# Patient Record
Sex: MALE | Race: WHITE | NOT HISPANIC OR LATINO | Employment: STUDENT | URBAN - METROPOLITAN AREA
[De-identification: names, ages, dates, MRNs, and addresses within clinical notes are randomized per-mention and may not be internally consistent; named-entity substitution may affect disease eponyms.]

---
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
## 2019-09-16 ENCOUNTER — OFFICE VISIT (OUTPATIENT)
Dept: FAMILY MEDICINE CLINIC | Facility: CLINIC | Age: 19
End: 2019-09-16

## 2019-09-16 VITALS
DIASTOLIC BLOOD PRESSURE: 70 MMHG | HEART RATE: 66 BPM | SYSTOLIC BLOOD PRESSURE: 110 MMHG | WEIGHT: 178 LBS | HEIGHT: 71 IN | BODY MASS INDEX: 24.92 KG/M2

## 2019-09-16 DIAGNOSIS — Z02.89 ENCOUNTER FOR FEDERAL AVIATION ADMINISTRATION (FAA) EXAMINATION: Primary | ICD-10-CM

## 2019-09-16 PROCEDURE — 99499 UNLISTED E&M SERVICE: CPT | Performed by: FAMILY MEDICINE

## 2019-09-16 NOTE — PROGRESS NOTES
Chief Complaint   Patient presents with    Physical Exam     FAA 1st class (NO EKG) [facility] #[id number]  Uncorrected vision  No meds

## 2021-01-28 ENCOUNTER — OFFICE VISIT (OUTPATIENT)
Dept: FAMILY MEDICINE CLINIC | Facility: CLINIC | Age: 21
End: 2021-01-28
Payer: COMMERCIAL

## 2021-01-28 VITALS
BODY MASS INDEX: 25.92 KG/M2 | HEIGHT: 71 IN | DIASTOLIC BLOOD PRESSURE: 72 MMHG | WEIGHT: 185.14 LBS | OXYGEN SATURATION: 99 % | SYSTOLIC BLOOD PRESSURE: 116 MMHG | HEART RATE: 75 BPM | TEMPERATURE: 97.8 F

## 2021-01-28 DIAGNOSIS — Z11.3 SCREEN FOR STD (SEXUALLY TRANSMITTED DISEASE): ICD-10-CM

## 2021-01-28 DIAGNOSIS — Z23 NEEDS FLU SHOT: ICD-10-CM

## 2021-01-28 DIAGNOSIS — M25.511 CHRONIC RIGHT SHOULDER PAIN: ICD-10-CM

## 2021-01-28 DIAGNOSIS — Z23 IMMUNIZATION DUE: ICD-10-CM

## 2021-01-28 DIAGNOSIS — G89.29 CHRONIC RIGHT SHOULDER PAIN: ICD-10-CM

## 2021-01-28 DIAGNOSIS — Z00.00 ANNUAL PHYSICAL EXAM: Primary | ICD-10-CM

## 2021-01-28 PROBLEM — Z02.89 ENCOUNTER FOR FEDERAL AVIATION ADMINISTRATION (FAA) EXAMINATION: Status: RESOLVED | Noted: 2019-09-16 | Resolved: 2021-01-28

## 2021-01-28 PROCEDURE — 90715 TDAP VACCINE 7 YRS/> IM: CPT | Performed by: FAMILY MEDICINE

## 2021-01-28 PROCEDURE — 1036F TOBACCO NON-USER: CPT | Performed by: FAMILY MEDICINE

## 2021-01-28 PROCEDURE — 87591 N.GONORRHOEAE DNA AMP PROB: CPT | Performed by: FAMILY MEDICINE

## 2021-01-28 PROCEDURE — 87109 MYCOPLASMA: CPT | Performed by: FAMILY MEDICINE

## 2021-01-28 PROCEDURE — 3725F SCREEN DEPRESSION PERFORMED: CPT | Performed by: FAMILY MEDICINE

## 2021-01-28 PROCEDURE — 3008F BODY MASS INDEX DOCD: CPT | Performed by: FAMILY MEDICINE

## 2021-01-28 PROCEDURE — 90472 IMMUNIZATION ADMIN EACH ADD: CPT | Performed by: FAMILY MEDICINE

## 2021-01-28 PROCEDURE — 90686 IIV4 VACC NO PRSV 0.5 ML IM: CPT | Performed by: FAMILY MEDICINE

## 2021-01-28 PROCEDURE — 87491 CHLMYD TRACH DNA AMP PROBE: CPT | Performed by: FAMILY MEDICINE

## 2021-01-28 PROCEDURE — 99385 PREV VISIT NEW AGE 18-39: CPT | Performed by: FAMILY MEDICINE

## 2021-01-28 PROCEDURE — 90471 IMMUNIZATION ADMIN: CPT | Performed by: FAMILY MEDICINE

## 2021-01-28 NOTE — PATIENT INSTRUCTIONS
Biceps Tenodesis   AMBULATORY CARE:   What you need to know about a biceps tenodesis:  Biceps tenodesis is surgery to repair a biceps tendon tear or to help make the tendon more stable  The biceps tendon will be detached from your shoulder socket  The damaged part of the tendon will be removed  Then the tendon will be attached to your upper arm bone (called the humerus)  This helps relieve pain in your shoulder and make it more stable  How to prepare for surgery:   · This surgery may be done as an open surgery or arthroscopically  During an open procedure one large and several small incisions are made  During an arthroscopic procedure, several small incisions are made  An arthroscope is used to look inside your joint  Your surgeon will insert tools and the arthroscope into the incisions to work on the tendon  Your surgeon will talk to you about which kind of surgery is right for you  · You may be given medicine to use to clean your skin  You will need to use it at home a few days before surgery and on the day of surgery  The medicine will come as a soap to shower with or cloths to wipe the area  Your healthcare provider will give you specific instructions if you need to use this medicine  · Your surgeon will tell you how to prepare for surgery  You will be told not to eat or drink anything after midnight on the day of surgery  Your surgeon will tell you which medicines to take or not take on the day of surgery  Arrange for someone to drive you home after surgery and stay with you for at least 24 hours  What will happen during surgery:   · You will be given medicine to keep you asleep and pain-free during surgery  · The 2 main kinds of biceps tenodesis surgery are soft tissue and hardware fixation:    ? For a soft tissue surgery,  the tendon is moved into a groove made in the humerus  Your surgeon will detach the tendon, roll the end into a ball, and stitch it together   Then it is put into the groove made in the humerus and moved so it will stay in place  ? For a hardware fixation surgery,  the tendon is moved into a hole your surgeon will make  A screw or other device will be used to fix the tendon to the bone  What will happen after surgery: Your arm will be put into a sling for up to 6 weeks to keep it from moving while you heal  Your healthcare providers will tell you if you need to sleep in the sling and when to wear it during the day  You may have a stiff or painful shoulder after surgery  This is normal and should get better with time and physical therapy  Your providers will tell you when to start having shoulder rehabilitation  These are exercises that will help increase your range of motion  Risks of a biceps tenodesis:  You may develop an infection or bleed more than expected during surgery  You may also develop a hematoma (collection of blood) or a seroma (collection of fluid) under your skin  Nerves or veins may be injured or the humerus may be broken  You may have an allergic reaction to the fixation device  Any device used to fix the tendon to bone may fail  You may have chronic shoulder pain or stiffness, even after physical therapy  Seek care immediately if:   · Blood soaks through your bandage  · You cannot feel or move your fingers or arm  Contact your healthcare provider if:   · You have signs of infection in the surgery wound, such as swelling, pus, or red streaks  · You have a fever or chills  · You have severe pain that is not helped with pain medicine  · You have questions or concerns about your condition or care  Medicines:   · Prescription pain medicine  may be given  Ask your healthcare provider how to take this medicine safely  Some prescription pain medicines contain acetaminophen  Do not take other medicines that contain acetaminophen without talking to your healthcare provider  Too much acetaminophen may cause liver damage   Prescription pain medicine may cause constipation  Ask your healthcare provider how to prevent or treat constipation  · Take your medicine as directed  Contact your healthcare provider if you think your medicine is not helping or if you have side effects  Tell him or her if you are allergic to any medicine  Keep a list of the medicines, vitamins, and herbs you take  Include the amounts, and when and why you take them  Bring the list or the pill bottles to follow-up visits  Carry your medicine list with you in case of an emergency  Apply ice to your shoulder:  Ice helps decrease swelling and pain and may help prevent tissue damage  Use an ice pack or put crushed ice in a plastic bag  Cover the bag with a towel before you apply it to your skin  Apply the pack or bag to your shoulder for 15 to 20 minutes every hour, or as directed  Your healthcare provider will tell you how often to do this and for how many days after surgery  Care for your incision wound as directed:   · Keep your wound dry  When your healthcare provider says it is okay to bathe, cover the incision wound with a plastic bag  Tape the ends of the bag to your skin so water does not get in  Take showers instead of baths until your healthcare provider says it is okay to take a bath  Do not swim or soak in a hot tub  · Clean your wound as directed  Your healthcare provider will tell you when it is okay to remove the bandage and care for your wound  He or she will tell you how often to clean the wound and what to use to clean it  Check for signs of infection each time you care for the wound  Signs include pus, swelling, a fever, chills, and red streaks coming from the wound  Activity:   · Your healthcare provider and physical therapist will give you specific instructions to follow  It is very important to follow the instructions carefully so you do not injure your shoulder while it is healing      · Your healthcare provider will tell you when you can start driving and doing your normal activities again  Tell your provider what kind of work you do  You may need specific instructions if you do work that requires lifting objects or moving your arm overhead  · Do not  lift anything until your healthcare provider says it is okay  · Do not  play any sports that need throwing or overhead motion (such as baseball or tennis) until at least 16 weeks after surgery  Talk to your healthcare provider or physical therapist to make sure you are ready for this activity  Go to physical therapy as directed:  Physical therapy will happen in 4 phases:  · The protective phase  is the first 6 weeks after surgery  You will need to protect your shoulder by wearing a sling  Your healthcare providers will tell you how long to wear the sling each day and if you need to sleep in it  During this time, you will mainly do passive range-of-motion exercises  This means your physical therapist will gently move your shoulder in different directions to keep it flexible  · The moderate protection phase  is from week 7 to week 12  You will start wearing the sling less often  Physical therapy will focus on strengthening your rotator cuff  You will use bands or weights to help with this  You will begin to strengthen your biceps at about week 10  Your physical therapist will help you do this safely  You may also need to do stretching exercises at week 10 to improve your range of motion  · The minimum protection phase  is from week 13 to week 20  You will be doing active and passive range-of-motion exercises at this stage  You will also do exercises to increase strength  · The advanced strengthening phase  is from week 21 to week 26  At this stage, you will be doing advanced exercises to increase strength  Follow up with your healthcare provider as directed:  Write down your questions so you remember to ask them during your visits     © Copyright [employer] 2020 Information is for End User's use only and may not be sold, redistributed or otherwise used for commercial purposes  All illustrations and images included in CareNotes® are the copyrighted property of A D A M , Inc  or Alissa Delaney  The above information is an  only  It is not intended as medical advice for individual conditions or treatments  Talk to your doctor, nurse or pharmacist before following any medical regimen to see if it is safe and effective for you  Shoulder Impingement Syndrome   WHAT YOU NEED TO KNOW:   The most common type of shoulder impingement syndrome is subacromial impingement syndrome (SIS)  SIS happens when the tendons or bursa become trapped between bones of your shoulder joint  You have pain when you reach over your head or lie on the shoulder while sleeping  You may also have pain when you move your arm out to the side or behind your body  DISCHARGE INSTRUCTIONS:   Return to the emergency department if:   · Your shoulder, arm, hand, or fingers turn bluish or pale, or feel cold or numb  · Your pain gets worse, even after rest and medicine  · You cannot move your fingers  Call your doctor if:   · You have questions or concerns about your condition or care  Medicines: You may  be given any of the following:  · Acetaminophen  decreases pain  It is available without a doctor's order  Ask how much to take and how often to take it  Follow directions  Read the labels of all other medicines you are using to see if they also contain acetaminophen, or ask your doctor or pharmacist  Acetaminophen can cause liver damage if not taken correctly  Do not use more than 4 grams (4,000 milligrams) total of acetaminophen in one day  · NSAIDs,  such as ibuprofen, help decrease swelling and pain  This medicine is available with or without a doctor's order  NSAIDs can cause stomach bleeding or kidney problems in certain people   If you take blood thinner medicine, always ask your healthcare provider if NSAIDs are safe for you  Always read the medicine label and follow directions  · Take your medicine as directed  Contact your healthcare provider if you think your medicine is not helping or if you have side effects  Tell him or her if you are allergic to any medicine  Keep a list of the medicines, vitamins, and herbs you take  Include the amounts, and when and why you take them  Bring the list or the pill bottles to follow-up visits  Carry your medicine list with you in case of an emergency  Rest  will help your shoulder pain  Limit reaching above your shoulder's height  Apply ice  on your shoulder for 20 minutes 1 or 2 times in a day  Use an ice pack, or put crushed ice in a plastic bag  You may also use a bag of frozen peas or corn  Cover the bag with a towel  Ice decreases swelling and pain  Follow up with your doctor and physical therapist as directed:  Write down your questions so you remember to ask them during your visits  © Copyright [address] Information is for End User's use only and may not be sold, redistributed or otherwise used for commercial purposes  All illustrations and images included in CareNotes® are the copyrighted property of A D A [employer] , Inc  or [address] [place], [place] [address]   The above information is an  only  It is not intended as medical advice for individual conditions or treatments  Talk to your doctor, nurse or pharmacist before following any medical regimen to see if it is safe and effective for you

## 2021-01-28 NOTE — PROGRESS NOTES
ADULT ANNUAL [address]    NAME: Ethelyn Galeazzi  AGE: 23 y o  SEX: male  : 2000  DATE: 2021     Assessment and Plan:     Problem List Items Addressed This Visit        Other    Chronic right shoulder pain     Return to office with exacerbation  Handout provided           Other Visit Diagnoses     Annual physical exam    -  Primary    Needs flu shot        Relevant Orders    influenza vaccine, quadrivalent, 0 5 mL, preservative-free, for adult and pediatric patients 6 mos+ (AFLURIA, FLUARIX, FLULAVAL, FLUZONE) (Completed)    Immunization due        Relevant Orders    TDAP VACCINE GREATER THAN OR EQUAL TO 6YO IM (Completed)    Screen for STD (sexually transmitted disease)        Relevant Orders    Mycoplasma / ureaplasma culture    Chlamydia/GC amplified DNA by PCR    BMI 25 0-25 9,adult            Immunizations and preventive care screenings were discussed with patient today  Appropriate education was printed on patient's after visit summary  Counseling:  Alcohol/drug use: discussed moderation in alcohol intake, the recommendations for healthy alcohol use, and avoidance of illicit drug use  Dental Health: discussed importance of regular tooth brushing, flossing, and dental visits  Injury prevention: discussed safety/seat belts, safety helmets, smoke detectors, carbon dioxide detectors, and smoking near bedding or upholstery  Sexual health: discussed sexually transmitted diseases, partner selection, use of condoms, avoidance of unintended pregnancy, and contraceptive alternatives  · Exercise: the importance of regular exercise/physical activity was discussed  Recommend exercise 3-5 times per week for at least 30 minutes  BMI Counseling: Body mass index is 25 82 kg/m²   The BMI is above normal  Nutrition recommendations include decreasing portion sizes, encouraging healthy choices of fruits and vegetables and limiting drinks that contain sugar  Exercise recommendations include moderate physical activity 150 minutes/week, exercising 3-5 times per week and strength training exercises  Patient referred to PCP due to patient being overweight  Return in about 1 year (around 1/28/2022) for Annual physical      Chief Complaint:     Chief Complaint   Patient presents with    Annual Exam      History of Present Illness:     Adult Annual Physical   Patient here for a comprehensive physical exam  The patient reports no problems  He has had problems with right shoulder pain  First started hurting 5-6 years ago when he was playing volleyball  He has now been weightlifting and wants to compete in powerlifting next year, but has had periodic exacerbations of his right shoulder  He is left-hand dominant  Diet and Physical Activity  · Diet/Nutrition: well balanced diet  · Exercise: moderate cardiovascular exercise, 5-7 times a week on average and 30-60 minutes on average  Depression Screening  PHQ-9 Depression Screening    PHQ-9:   Frequency of the following problems over the past two weeks:      Little interest or pleasure in doing things: 0 - not at all  Feeling down, depressed, or hopeless: 0 - not at all  PHQ-2 Score: 0       General Health  · Sleep: sleeps well  · Hearing: normal - bilateral   · Vision: no vision problems  · Dental: regular dental visits  [facility]  · History of STDs?: yes ureaplasma, treated, no test of cure  Review of Systems:     Review of Systems   Constitutional: Negative for activity change, chills and fever  HENT: Negative for congestion, rhinorrhea and sore throat  Eyes: Negative for visual disturbance  Respiratory: Negative for cough, shortness of breath and wheezing  Cardiovascular: Negative for chest pain and palpitations  Gastrointestinal: Negative for abdominal pain, blood in stool, constipation, diarrhea, nausea and vomiting     Genitourinary: Negative for dysuria  Musculoskeletal: Positive for arthralgias  Negative for myalgias  Skin: Negative for rash  Neurological: Negative for dizziness, weakness and headaches  Psychiatric/Behavioral: Negative for dysphoric mood and sleep disturbance  All other systems reviewed and are negative  Past Medical History:     Past Medical History:   Diagnosis Date    Carrier of ureaplasma urealyticum     last assessed: 1/22/2016    Finger fracture, right     Second finger      Past Surgical History:     Past Surgical History:   Procedure Laterality Date    NO PAST SURGERIES        Social History:     E-Cigarette/Vaping    E-Cigarette Use Former User     Quit Date 8/15/19      E-Cigarette/Vaping Substances    Nicotine Yes      Social History     Socioeconomic History    Marital status: Single     Spouse name: None    Number of children: None    Years of education: None    Highest education level: None   Occupational History    None   Social Needs    Financial resource strain: None    Food insecurity     Worry: None     Inability: None    Transportation needs     Medical: None     Non-medical: None   Tobacco Use    Smoking status: Never Smoker    Smokeless tobacco: Former User     Types: Chew   Substance and Sexual Activity    Alcohol use: Yes     Frequency: 2-3 times a week     Drinks per session: 1 or 2     Binge frequency: Never    Drug use: No    Sexual activity: Yes     Partners: Female     Birth control/protection: I U D     Lifestyle    Physical activity     Days per week: 5 days     Minutes per session: 40 min    Stress: Not at all   Relationships    Social connections     Talks on phone: None     Gets together: None     Attends [employer] service: None     Active member of club or organization: None     Attends meetings of clubs or organizations: None     Relationship status: None    Intimate partner violence     Fear of current or ex partner: None     Emotionally abused: None     Physically abused: None     Forced sexual activity: None   Other Topics Concern    None   Social History Narrative    Regular dental care     No tobacco/smoke exposure      Family History:     Family History   Problem Relation Age of Onset    Melanoma Mother     No Known Problems Father     Diabetes Maternal Grandfather     Skin cancer Paternal Grandfather     Substance Abuse Neg Hx     Mental illness Neg Hx     Colon cancer Neg Hx     Breast cancer Neg Hx     Prostate cancer Neg Hx       Current Medications:     No current outpatient medications on file  No current facility-administered medications for this visit  Allergies: Allergies   Allergen Reactions    Apple Throat Swelling      Physical Exam:     /72   Pulse 75   Temp 97 8 °F (36 6 °C)   Ht 5' 11" (1 803 m)   Wt 84 kg (185 lb 2 2 oz)   SpO2 99%   BMI 25 82 kg/m²     Physical Exam  Vitals signs and nursing note reviewed  Constitutional:       General: He is not in acute distress  Appearance: Normal appearance  He is well-developed and normal weight  HENT:      Head: Normocephalic and atraumatic  Right Ear: Tympanic membrane, ear canal and external ear normal       Left Ear: Tympanic membrane, ear canal and external ear normal       Nose: Nose normal       Mouth/Throat:      Mouth: Mucous membranes are moist       Pharynx: No oropharyngeal exudate  Eyes:      Extraocular Movements: Extraocular movements intact  Conjunctiva/sclera: Conjunctivae normal       Pupils: Pupils are equal, round, and reactive to light  Neck:      Trachea: No tracheal deviation  Cardiovascular:      Rate and Rhythm: Normal rate and regular rhythm  Pulses: Normal pulses  Heart sounds: Normal heart sounds  No murmur  Pulmonary:      Effort: Pulmonary effort is normal  No respiratory distress  Breath sounds: Normal breath sounds  No wheezing, rhonchi or rales  Chest:      Chest wall: No swelling, crepitus or edema  Abdominal:      General: Bowel sounds are normal  There is no distension  Palpations: Abdomen is soft  There is no mass  Tenderness: There is no abdominal tenderness  There is no guarding  Musculoskeletal:      Right lower leg: No edema  Left lower leg: No edema  Comments: Tender to palpation on lateral aspect of acromion   Points to biceps tendon insertion of right should as point of prior pain, not currently tender    Lymphadenopathy:      Cervical: No cervical adenopathy  Skin:     General: Skin is warm and dry  Capillary Refill: Capillary refill takes less than 2 seconds  Findings: No erythema  Neurological:      General: No focal deficit present  Mental Status: He is alert and oriented to person, place, and time  Cranial Nerves: No cranial nerve deficit        Deep Tendon Reflexes: Reflexes normal    Psychiatric:         Mood and Affect: Mood normal         Oralia Sibley MD   [facility]

## 2021-01-29 LAB
C TRACH DNA SPEC QL NAA+PROBE: NEGATIVE
N GONORRHOEA DNA SPEC QL NAA+PROBE: NEGATIVE

## 2021-02-03 LAB
M HOMINIS SPEC QL CULT: NEGATIVE
U UREALYTICUM SPEC QL CULT: NEGATIVE

## 2023-05-30 ENCOUNTER — OFFICE VISIT (OUTPATIENT)
Dept: FAMILY MEDICINE CLINIC | Facility: CLINIC | Age: 23
End: 2023-05-30

## 2023-05-30 VITALS
RESPIRATION RATE: 16 BRPM | HEIGHT: 71 IN | OXYGEN SATURATION: 99 % | BODY MASS INDEX: 26.04 KG/M2 | HEART RATE: 68 BPM | WEIGHT: 186 LBS | DIASTOLIC BLOOD PRESSURE: 70 MMHG | SYSTOLIC BLOOD PRESSURE: 112 MMHG

## 2023-05-30 DIAGNOSIS — Z02.89 ENCOUNTER FOR FEDERAL AVIATION ADMINISTRATION (FAA) EXAMINATION: Primary | ICD-10-CM

## 2023-05-30 NOTE — PROGRESS NOTES
Chief Complaint   Patient presents with   • FAA     1st class, no ekg, no correctives  C#: [id number]

## 2023-10-23 ENCOUNTER — HOSPITAL ENCOUNTER (OUTPATIENT)
Dept: RADIOLOGY | Facility: HOSPITAL | Age: 23
Discharge: HOME | End: 2023-10-23
Payer: COMMERCIAL

## 2023-10-23 ENCOUNTER — OFFICE VISIT (OUTPATIENT)
Dept: FAMILY MEDICINE CLINIC | Facility: CLINIC | Age: 23
End: 2023-10-23
Payer: COMMERCIAL

## 2023-10-23 VITALS
HEART RATE: 77 BPM | WEIGHT: 182 LBS | DIASTOLIC BLOOD PRESSURE: 82 MMHG | OXYGEN SATURATION: 98 % | SYSTOLIC BLOOD PRESSURE: 120 MMHG | BODY MASS INDEX: 25.48 KG/M2 | TEMPERATURE: 98.3 F | HEIGHT: 71 IN

## 2023-10-23 DIAGNOSIS — M54.50 CHRONIC RIGHT-SIDED LOW BACK PAIN WITHOUT SCIATICA: ICD-10-CM

## 2023-10-23 DIAGNOSIS — M54.50 CHRONIC RIGHT-SIDED LOW BACK PAIN WITHOUT SCIATICA: Primary | ICD-10-CM

## 2023-10-23 DIAGNOSIS — G89.29 CHRONIC RIGHT-SIDED LOW BACK PAIN WITHOUT SCIATICA: ICD-10-CM

## 2023-10-23 DIAGNOSIS — G89.29 CHRONIC RIGHT-SIDED LOW BACK PAIN WITHOUT SCIATICA: Primary | ICD-10-CM

## 2023-10-23 PROCEDURE — 72110 X-RAY EXAM L-2 SPINE 4/>VWS: CPT

## 2023-10-23 PROCEDURE — 99213 OFFICE O/P EST LOW 20 MIN: CPT | Performed by: FAMILY MEDICINE

## 2023-10-23 NOTE — PROGRESS NOTES
Name: Antonio Wilkinson      : 2000      MRN: [id number]  Encounter Provider: Paulette Panda MD  Encounter Date: 10/23/2023   Encounter department: [address] [place] [address]     1. Chronic right-sided low back pain without sciatica  -     Ambulatory Referral to Physical Therapy; Future  -     XR spine lumbar minimum 4 views non injury; Future; Expected date: 10/23/2023       Discussion:  I reviewed with pt. ?facet related? Check XR of LS spine. Refer to PT. Continue OTC NSAIDs prn. Recheck 3-4w if not improving - earlier if worse    Subjective     58-year-old male presents with a approximately 1 year and 2-month history of right lower back pain. Patient states that he was in normal state of health until approximately 14 months ago when he felt pain in his right lower back while dead lifting. Pain was mild to moderate initially but worsened overnight. Patient did not seek medical help. Pain did not radiate down his legs at that time. Patient laid off the lifting, rested and pain slowly got better. Since then, patient had intermittent pain that would come with certain activities as well as with twisting bending etc.  Patient states he has felt well for last 2 days but had pain before that. Pain does not appear to have a shoot down the legs is always located in the right lower back. Review of Systems   Constitutional: Negative. Gastrointestinal: Negative. Genitourinary: Negative. Musculoskeletal:  Positive for back pain and myalgias. Negative for arthralgias and joint swelling. Skin: Negative. Neurological:  Positive for numbness. Negative for weakness.        Past Medical History:   Diagnosis Date   • Finger fracture, right     Second finger     Past Surgical History:   Procedure Laterality Date   • NO PAST SURGERIES       Family History   Problem Relation Age of Onset   • Melanoma Mother    • No Known Problems Father    • Diabetes Maternal Grandfather    • Skin cancer Paternal Grandfather    • Substance Abuse Neg Hx    • Mental illness Neg Hx    • Colon cancer Neg Hx    • Breast cancer Neg Hx    • Prostate cancer Neg Hx      Social History     Socioeconomic History   • Marital status: Single     Spouse name: None   • Number of children: None   • Years of education: None   • Highest education level: None   Occupational History   • None   Tobacco Use   • Smoking status: Never     Passive exposure: Never   • Smokeless tobacco: Former     Types: Chew   • Tobacco comments:     Used chew twice. Vaping Use   • Vaping Use: Former   • Quit date: 8/15/2019   • Substances: Nicotine   Substance and Sexual Activity   • Alcohol use: Not Currently   • Drug use: No   • Sexual activity: Yes     Partners: Female     Birth control/protection: I.U.D. Other Topics Concern   • None   Social History Narrative    Regular dental care     No tobacco/smoke exposure     Social Determinants of Health     Financial Resource Strain: Not on file   Food Insecurity: Not on file   Transportation Needs: Not on file   Physical Activity: Sufficiently Active (1/28/2021)    Exercise Vital Sign    • Days of Exercise per Week: 5 days    • Minutes of Exercise per Session: 40 min   Stress: No Stress Concern Present (1/28/2021)    [address] [place] [address]    • Feeling of Stress : Not at all   Social Connections: Not on file   Intimate Partner Violence: Not on file   Housing Stability: Not on file     No current outpatient medications on file prior to visit.      Allergies   Allergen Reactions   • Apple - Food Allergy Throat Swelling   • Seasonal Ic [Octacosanol] Other (See Comments)     Congestion, itchy eyes, PND     Immunization History   Administered Date(s) Administered   • DTaP 5 2000, 2000, 2000, 12/17/2000, 03/25/2004   • HPV Quadrivalent 06/24/2014, 05/11/2015, 07/10/2015   • Hep A, adult 01/03/2012, 07/12/2012   • Hep B, adult 2000, 1999, 03/14/2000   • Hib (PRP-OMP) 2000, 1999, 1999, 09/13/2000   • IPV 2000, 1999, 02/14/2004, 03/25/2004   • Influenza Quadrivalent Preservative Free 3 years and older IM 09/21/2016   • Influenza, injectable, quadrivalent, preservative free 0.5 mL 01/28/2021   • MMR 06/07/2000, 03/25/2004   • Meningococcal, Unknown Serogroups 11/22/2010, 05/11/2015   • Tdap 11/22/2010, 01/28/2021   • Varicella 03/11/2002, 10/30/2007       Objective     /82 (BP Location: Left arm, Patient Position: Sitting, Cuff Size: Adult)   Pulse 77   Temp 98.3 °F (36.8 °C)   Ht 5' 10.75" (1.797 m)   Wt 82.6 kg (182 lb)   SpO2 98%   BMI 25.56 kg/m²     Physical Exam  Vitals reviewed. Abdominal:      General: There is no distension. Palpations: There is no mass. Tenderness: There is no abdominal tenderness. There is no right CVA tenderness or left CVA tenderness. Musculoskeletal:         General: Tenderness (TTP over the R low paralumbar muscles.) present. No deformity. Right lower leg: No edema. Left lower leg: No edema. Skin:     Capillary Refill: Capillary refill takes less than 2 seconds. Neurological:      Mental Status: He is alert. Sensory: No sensory deficit. Motor: No weakness.       Gait: Gait normal.      Deep Tendon Reflexes: Reflexes normal.       Iraida Aguilar MD

## 2023-10-30 ENCOUNTER — EVALUATION (OUTPATIENT)
Dept: PHYSICAL THERAPY | Facility: CLINIC | Age: 23
End: 2023-10-30
Payer: COMMERCIAL

## 2023-10-30 DIAGNOSIS — M54.50 CHRONIC RIGHT-SIDED LOW BACK PAIN WITHOUT SCIATICA: Primary | ICD-10-CM

## 2023-10-30 DIAGNOSIS — G89.29 CHRONIC RIGHT-SIDED LOW BACK PAIN WITHOUT SCIATICA: Primary | ICD-10-CM

## 2023-10-30 PROCEDURE — 97161 PT EVAL LOW COMPLEX 20 MIN: CPT

## 2023-10-30 PROCEDURE — 97110 THERAPEUTIC EXERCISES: CPT

## 2023-10-30 NOTE — PROGRESS NOTES
PT Evaluation     Today's date: 10/30/2023  Patient name: Leslie Melo  : 2000  MRN: [id number]  Referring provider: Amanda Bassett*  Dx:   Encounter Diagnosis     ICD-10-CM    1. Chronic right-sided low back pain without sciatica  M54.50 Ambulatory Referral to Physical Therapy    G89.29           Start Time: 0845  Stop Time: 930  Total time in clinic (min): 45 minutes    Assessment  Assessment details: Leslie Melo is a 24 y.o. male who presents with pain, decreased strength, and decreased joint mobility. Due to these impairments, Patient has difficulty performing a/iadls, recreational activities, work-related activities, and engaging in social activities. Patient has signs and symptoms consistent with their referring diagnosis of Chronic right-sided low back pain without sciatica. Patient would benefit from skilled physical therapy to address the impairments, improve their level of function and to improve their overall quality of life.     Impairments: abnormal or restricted ROM, activity intolerance, impaired physical strength, lacks appropriate home exercise program and pain with function    Symptom irritability: lowUnderstanding of Dx/Px/POC: good   Prognosis: good    Goals  Short Term Goals: to be achieved by 4 weeks  1) Patient to be independent with basic HEP  2) Decrease pain to 2/10 at its worst  3) Increase LE strength by 1/2 MMT grade in all affected planes    Long Term Goals: to be achieved by discharge  1) FOTO equal to or greater than projected goal.  2) Ambulation to improve to maximal level of function  3) Lifting will improve to maximal level of function       Plan  Patient would benefit from: PT eval  Planned modality interventions: low level laser therapy  Planned therapy interventions: manual therapy, neuromuscular re-education, therapeutic activities, therapeutic exercise and home exercise program  Frequency: 1x week  Duration in visits: 6  Duration in weeks: 6  Treatment plan discussed with: patient        Subjective Evaluation    History of Present Illness  Date of onset: 2022  Mechanism of injury: History of Current Injury: Pt mentioned to have been dead lifting. Noted to have rushed through the last set when he noted to have thrown out his back. Pt mentioned to have taken ibuprofen and used rest to try to improve the pain. Noted to have improved slightly over the year however has had to stop certain activities due to back pain. Noted to not be able to dead lift, squat, perform farmers carries, and long hikes to cause pain. Pt went to PCP who referred to PT. X-ray take on 10/23/2023 with results pending. Pain location/Descriptors: R lower back region, sharp and aching   Aggravating factors: working out, walking far, flight bag for work   Easing factors: leaning forward and pushing down on hips  Imaging: 10/23/23: taken awaiting results  Patient goals: be able to perform greater physical activity without pain  Special interest or hobbies: volleyball, running, working out, and hiking  Occupation:           Not a recurrent problem   Quality of life: good    Patient Goals  Patient goals for therapy: decreased pain, increased motion, increased strength, return to sport/leisure activities and independence with ADLs/IADLs    Pain  Current pain ratin  At best pain ratin  At worst pain ratin  Quality: sharp and dull ache  Relieving factors: change in position  Aggravating factors: walking and lifting    Social Support  Steps to enter house: yes  Stairs in house: yes   Lives in: multiple-level home  Lives with: parents    Employment status: working  Hand dominance: left      Diagnostic Tests  Abnormal x-ray: 10/23/23: awaiting results.   Treatments  No previous or current treatments        Objective     Palpation   Left   No palpable tenderness to the erector spinae, external abdominal oblique, gluteus brad, gluteus medius, iliacus, iliopsoas, internal abdominal oblique, lumbar interspinals, lumbar paraspinals, piriformis, quadratus lumborum, rectus abdominus and transverse abdominus. Right   No palpable tenderness to the external abdominal oblique, gluteus brad, gluteus medius, iliacus, iliopsoas, internal abdominal oblique, lumbar interspinals, piriformis, rectus abdominus and transverse abdominus. Tenderness of the erector spinae, lumbar paraspinals and quadratus lumborum. Active Range of Motion     Lumbar   Flexion: Active lumbar flexion: able to touch ground. WFL  Left lateral flexion:  Restriction level: minimal  Right lateral flexion: 5 degrees  Restriction level: maximal  Left rotation:  Restriction level: minimal  Right rotation:  Restriction level: minimal  Left Hip   Flexion: 120 degrees   Extension: 20 degrees     Right Hip   Flexion: 120 degrees   Extension: 20 degrees     Additional Active Range of Motion Details  R hamstring length: 55 deg from 0 deg. L hamstring length: 40 deg from 0 deg. Joint Play   Joints within functional limits: L4 and L5     Hypomobile: T8, T9, T10, T11, T12, L1, L2, L3 and S1     Strength/Myotome Testing     Left Hip   Planes of Motion   Flexion: 5  Extension: 5  Abduction: 4-    Right Hip   Planes of Motion   Flexion: 4+  Extension: 5  Abduction: 4-    Functional Assessment      Squat    within functional limits  Left within functional limits and right within functional limits. Lunge   Left Leg  Within functional limits. Right Leg  Within functional limits. General Comments:      Lumbar Comments  No increased pain with flexion, ext, sean rotation, and sean lateral flex movements.              Precautions: R shoulder pain      Manuals 10/30            STM             Joint Mobility                                       Neuro Re-Ed             Lateral stepping              Dead Bugs             Planking on bosu                                                                 Ther Ex             Grisel YKLE TB, 3x8            Hamstring stretch Seated  3x30"  HEP            CC hip abd             Lumbar rollouts                                                                 Ther Activity                                       Gait Training                                       Modalities

## 2023-11-06 ENCOUNTER — OFFICE VISIT (OUTPATIENT)
Dept: PHYSICAL THERAPY | Facility: CLINIC | Age: 23
End: 2023-11-06
Payer: COMMERCIAL

## 2023-11-06 DIAGNOSIS — G89.29 CHRONIC RIGHT-SIDED LOW BACK PAIN WITHOUT SCIATICA: Primary | ICD-10-CM

## 2023-11-06 DIAGNOSIS — M54.50 CHRONIC RIGHT-SIDED LOW BACK PAIN WITHOUT SCIATICA: Primary | ICD-10-CM

## 2023-11-06 PROCEDURE — 97110 THERAPEUTIC EXERCISES: CPT

## 2023-11-06 PROCEDURE — 97140 MANUAL THERAPY 1/> REGIONS: CPT

## 2023-11-06 PROCEDURE — 97112 NEUROMUSCULAR REEDUCATION: CPT

## 2023-11-06 NOTE — PROGRESS NOTES
Daily Note     Today's date: 2023  Patient name: Ming Freeman  : 2000  MRN: [id number]  Referring provider: Jacquelyn Domingo*  Dx:   Encounter Diagnosis     ICD-10-CM    1. Chronic right-sided low back pain without sciatica  M54.50     G89.29           Start Time: 0845  Stop Time: 0930  Total time in clinic (min): 45 minutes    Subjective: The patient presents for the first follow-up appointment, reports that symptoms stayed the same and that he was compliant most of the time with the initial HEP. Notes to have played volleyball on Friday with discomfort near the end of the game. Objective: See treatment diary below      Assessment: The patient tolerated manual and active treatment well today. The PT reviewed IHEP and introduced initial manual and ther-ex program during today's treatment. Cuing provided as indicated below in exercise flowsheet. Educated pt about DOMs and the importance of tracking symptoms in order to help progress POC appropriately. The patient demonstrated good response to today's treatment. Plan: Continue per plan of care. Precautions: R shoulder pain      Manuals 10/30 11/6           STM  R paraspinals, erector spinae, QL, trigger point release            Joint Mobility  P-A grade III T6-L5                                     Neuro Re-Ed             Lateral stepping              Dead Bugs  10x10" w/ p pb           Planking side  3x20" sean; Min VC in order to maintain proper alignment and prevent hip hinging           Stir the pot CC TA activation  12lbs, 2x8 CW and CCW           CC chops  2x8, 20lbs;  Min VC in order to maintain squat and twist                                     Ther Ex             Clams R TB, 3x8            Hamstring stretch Seated  3x30"  HEP            CC hip abd             Lumbar rollouts  3 ways, seated, pb, 10x10"           treadmill  5' brisk walk                                                  Ther Activity Gait Training                                       Modalities

## 2023-11-13 ENCOUNTER — OFFICE VISIT (OUTPATIENT)
Dept: PHYSICAL THERAPY | Facility: CLINIC | Age: 23
End: 2023-11-13
Payer: COMMERCIAL

## 2023-11-13 DIAGNOSIS — G89.29 CHRONIC RIGHT-SIDED LOW BACK PAIN WITHOUT SCIATICA: Primary | ICD-10-CM

## 2023-11-13 DIAGNOSIS — M54.50 CHRONIC RIGHT-SIDED LOW BACK PAIN WITHOUT SCIATICA: Primary | ICD-10-CM

## 2023-11-13 PROCEDURE — 97140 MANUAL THERAPY 1/> REGIONS: CPT

## 2023-11-13 PROCEDURE — 97112 NEUROMUSCULAR REEDUCATION: CPT

## 2023-11-13 NOTE — PROGRESS NOTES
Daily Note     Today's date: 2023  Patient name: Kayy Washington  : 2000  MRN: [id number]  Referring provider: Marcelo Jordan  Dx:   Encounter Diagnosis     ICD-10-CM    1. Chronic right-sided low back pain without sciatica  M54.50     G89.29           Start Time: 0850  Stop Time: 0930  Total time in clinic (min): 40 minutes    Subjective: The patient reports no new complaints today. The patient reports improvement in symptoms since previous session. Noted to not have any pain or soreness following LV. Objective: See treatment diary below      Assessment: The PT continued manual therapy, therapeutic exercise, and neuromuscular reeducation during today's session. The patient's program was progressed by adding reverse chops and bird dogs  to promote core activation with extremity movement and TA activation with twisting motion. Increased load and reps for exercises in order to improve strength and muscle firing. Updated HEP in order to progress HEP and educated pt on the changes. The patient tolerated manual and active treatment well today. The patient would benefit from further skilled PT services. Plan: Continue per plan of care. Precautions: R shoulder pain      Manuals 10/30 11/6 11/13          STM  R paraspinals, erector spinae, QL, trigger point release  R paraspinals, erector spinae, QL, trigger point release           Joint Mobility  P-A grade III T6-L5 P-A grade III T6-L5                                    Neuro Re-Ed             Lateral stepping              Dead Bugs  10x10" w/ p pb           Planking side  3x20" sean; Min VC in order to maintain proper alignment and prevent hip hinging 3x30" sean;           Stir the pot CC TA activation  12lbs, 2x8 CW and CCW 18lbs, 2x8 CW and CCW          CC chops  2x8, 20lbs; Min VC in order to maintain squat and twist 2x12, 25lbs;  Min VC in order to maintain squat and twist          TA activation CC ABC   1x sean, 12lbs CC reverse chops   25lbs, 3x12; Min VC in order to maintain squat and twist          Bird dogs    2x8, UE alternating only, quadruped; Isrrael Pinch in order to maintain neutral spine          Ther Ex             Clams R TB, 3x8            Hamstring stretch Seated  3x30"  HEP            CC hip abd             Lumbar rollouts  3 ways, seated, pb, 10x10"           treadmill  5' brisk walk 5' brisk walk                                                 Ther Activity                                       Gait Training                                       Modalities

## 2023-11-20 ENCOUNTER — OFFICE VISIT (OUTPATIENT)
Dept: PHYSICAL THERAPY | Facility: CLINIC | Age: 23
End: 2023-11-20
Payer: COMMERCIAL

## 2023-11-20 DIAGNOSIS — M54.50 CHRONIC RIGHT-SIDED LOW BACK PAIN WITHOUT SCIATICA: Primary | ICD-10-CM

## 2023-11-20 DIAGNOSIS — G89.29 CHRONIC RIGHT-SIDED LOW BACK PAIN WITHOUT SCIATICA: Primary | ICD-10-CM

## 2023-11-20 PROCEDURE — 97110 THERAPEUTIC EXERCISES: CPT

## 2023-11-20 PROCEDURE — 97112 NEUROMUSCULAR REEDUCATION: CPT

## 2023-11-20 NOTE — PROGRESS NOTES
Daily Note     Today's date: 2023  Patient name: Ari Armstrong  : 2000  MRN: [id number]  Referring provider: Marquise White*  Dx:   Encounter Diagnosis     ICD-10-CM    1. Chronic right-sided low back pain without sciatica  M54.50     G89.29           Start Time: 1230  Stop Time: 1300  Total time in clinic (min): 30 minutes    Subjective: The patient reports no new complaints today. The patient reports improvement in symptoms since previous session. Noted to have been getting back to the gym with no pain however greater fatigue with exercises. Mentioned to be stretching daily however notes hamstrings to still be tight. Objective: See treatment diary below      Assessment: The PT continued manual therapy, therapeutic exercise, and neuromuscular reeducation during today's session. Maintained exercises with increased dosage in order to increase difficulty. Educated pt to increase stretching to 3x/day in order to focus on lengthening of the hamstring muscle. The patient tolerated manual and active treatment well today. The patient would benefit from further skilled PT services. Plan: Continue per plan of care. Precautions: R shoulder pain      Manuals 10/30 11/6 11/13 11/20         STM  R paraspinals, erector spinae, QL, trigger point release  R paraspinals, erector spinae, QL, trigger point release           Joint Mobility  P-A grade III T6-L5 P-A grade III T6-L5                                    Neuro Re-Ed             Lateral stepping              Dead Bugs  10x10" w/ p pb           Planking side  3x20" sean; Min VC in order to maintain proper alignment and prevent hip hinging 3x30" sean;           Stir the pot CC TA activation  12lbs, 2x8 CW and CCW 18lbs, 2x8 CW and CCW          CC chops  2x8, 20lbs; Min VC in order to maintain squat and twist 2x12, 25lbs; Min VC in order to maintain squat and twist 2x12, 28lbs;  [place] Rinne in order to bend knees more         TA activation CC ABC   1x sean, 12lbs 1x sean, 14lbs         CC reverse chops   25lbs, 3x12; Min VC in order to maintain squat and twist 28lbs, 3x12;           Bird dogs    2x8, UE alternating only, quadruped; Isrrael Pinch in order to maintain neutral spine          Ther Ex             Clams R TB, 3x8            Hamstring stretch Seated  3x30"  HEP   Supine, G stretch strap 3x30"         CC hip abd             Lumbar rollouts  3 ways, seated, pb, 10x10"           treadmill  5' brisk walk 5' brisk walk 5' brisk walk         RDLs    10lb KB, 2x10; Min VC in order to prevent performing  squat                                   Ther Activity                                       Gait Training                                       Modalities

## 2023-11-27 ENCOUNTER — APPOINTMENT (OUTPATIENT)
Dept: PHYSICAL THERAPY | Facility: CLINIC | Age: 23
End: 2023-11-27
Payer: COMMERCIAL

## 2023-11-30 ENCOUNTER — OFFICE VISIT (OUTPATIENT)
Dept: PHYSICAL THERAPY | Facility: CLINIC | Age: 23
End: 2023-11-30
Payer: COMMERCIAL

## 2023-11-30 DIAGNOSIS — M54.50 CHRONIC RIGHT-SIDED LOW BACK PAIN WITHOUT SCIATICA: Primary | ICD-10-CM

## 2023-11-30 DIAGNOSIS — G89.29 CHRONIC RIGHT-SIDED LOW BACK PAIN WITHOUT SCIATICA: Primary | ICD-10-CM

## 2023-11-30 PROCEDURE — 97110 THERAPEUTIC EXERCISES: CPT

## 2023-11-30 PROCEDURE — 97112 NEUROMUSCULAR REEDUCATION: CPT

## 2023-11-30 NOTE — PROGRESS NOTES
Daily Note     Today's date: 2023  Patient name: Kiara Esquivel  : 2000  MRN: [id number]  Referring provider: Antonio Swann*  Dx:   Encounter Diagnosis     ICD-10-CM    1. Chronic right-sided low back pain without sciatica  M54.50     G89.29           Start Time: 1530  Stop Time: 1615  Total time in clinic (min): 45 minutes    Subjective: The patient reports no new complaints today. The patient reports improvement in symptoms since previous session. Mentioned to be feeling good recently. Noted improved strength however endurance is still difficulty. Objective: See treatment diary below      Assessment: The PT continued therapeutic exercise and neuromuscular reeducation during today's session. Maintained exercises with increased dosage in order to challenge muscles and co-contraction of muscles too prevent lumbar overloading. Updated HEP and educated pt on the changes. The patient tolerated manual and active treatment well today. The patient would benefit from further skilled PT services. Plan: Potential discharge next visit. Precautions: R shoulder pain      Manuals 10/30 11/6 11/13 11/20 11/30        STM  R paraspinals, erector spinae, QL, trigger point release  R paraspinals, erector spinae, QL, trigger point release           Joint Mobility  P-A grade III T6-L5 P-A grade III T6-L5                                    Neuro Re-Ed             Lateral stepping              Dead Bugs  10x10" w/ p pb           Planking side  3x20" sean; Min VC in order to maintain proper alignment and prevent hip hinging 3x30" sean;   3x30" sean;         Stir the pot CC TA activation  12lbs, 2x8 CW and CCW 18lbs, 2x8 CW and CCW  20lbs, 2x8 CW and CCW        CC chops  2x8, 20lbs; Min VC in order to maintain squat and twist 2x12, 25lbs; Min VC in order to maintain squat and twist 2x12, 28lbs;  Min VC in order to bend knees more 2x8, 35lbs;         TA activation CC ABC   1x sean, 12lbs 1x sean, 14lbs 1x sean, 16lbs        CC reverse chops   25lbs, 3x12; Min VC in order to maintain squat and twist 28lbs, 3x12;  35lbs, 2x8;         Bird dogs    2x8, UE alternating only, quadruped; Min VC in order to maintain neutral spine          Ther Ex             Clams R TB, 3x8            Hamstring stretch Seated  3x30"  HEP   Supine, G stretch strap 3x30" Supine, G stretch strap 3x30"        CC hip abd             Lumbar rollouts  3 ways, seated, pb, 10x10"           treadmill  5' brisk walk 5' brisk walk 5' brisk walk 5'  1' walking  4' jogging        RDLs    10lb KB, 2x10; Min VC in order to prevent performing  squat 10lb KB, 3x8;                                    Ther Activity                                       Gait Training                                       Modalities

## 2023-12-12 ENCOUNTER — OFFICE VISIT (OUTPATIENT)
Dept: PHYSICAL THERAPY | Facility: CLINIC | Age: 23
End: 2023-12-12
Payer: COMMERCIAL

## 2023-12-12 DIAGNOSIS — G89.29 CHRONIC RIGHT-SIDED LOW BACK PAIN WITHOUT SCIATICA: Primary | ICD-10-CM

## 2023-12-12 DIAGNOSIS — M54.50 CHRONIC RIGHT-SIDED LOW BACK PAIN WITHOUT SCIATICA: Primary | ICD-10-CM

## 2023-12-12 PROCEDURE — 97140 MANUAL THERAPY 1/> REGIONS: CPT

## 2023-12-12 PROCEDURE — 97110 THERAPEUTIC EXERCISES: CPT

## 2023-12-12 PROCEDURE — 97112 NEUROMUSCULAR REEDUCATION: CPT

## 2023-12-12 NOTE — PROGRESS NOTES
Daily Note     Today's date: 2023  Patient name: Verenice Carson  : 2000  MRN: [id number]  Referring provider: Pan Chavez  Dx:   Encounter Diagnosis     ICD-10-CM    1. Chronic right-sided low back pain without sciatica  M54.50     G89.29           Start Time: 0730  Stop Time: 0815  Total time in clinic (min): 45 minutes    Subjective: Pt notes to be feeling approximately 95% back to PLOF. Notes no difficulty at the gym. The patient reports no new complaints today. The patient reports improvement in symptoms since previous session. Objective: See treatment diary below      Assessment: Improvements noted in ROM, strength, pin, FOTO score, and palpation. These improvements allow the pt to partake in ADLs and recreational activities with less discomfort. The PT continued manual therapy, therapeutic exercise, and neuromuscular reeducation during today's session. Educated pt on the importance of continuing HEP in order to maintain improvements. Updated HEP and educated pt on the changes. The patient tolerated manual and active treatment well today. Plan: Discharge due to feeling approximately 95% back to PLOF and meeting all goals.     Goals  Short Term Goals: to be achieved by 4 weeks  1) Patient to be independent with basic HEP- met  2) Decrease pain to 2/10 at its worst- met  3) Increase LE strength by 1/2 MMT grade in all affected planes- met     Long Term Goals: to be achieved by discharge  1) FOTO equal to or greater than projected goal.- met  2) Ambulation to improve to maximal level of function- met  3) Lifting will improve to maximal level of function- met         Plan  Patient would benefit from: PT eval  Planned modality interventions: low level laser therapy  Planned therapy interventions: manual therapy, neuromuscular re-education, therapeutic activities, therapeutic exercise and home exercise program  Frequency: 1x week  Duration in visits: 6  Duration in weeks: 6  Treatment plan discussed with: patient           Subjective Evaluation     History of Present Illness  Date of onset: 2022  Mechanism of injury: History of Current Injury: Pt mentioned to have been dead lifting. Noted to have rushed through the last set when he noted to have thrown out his back. Pt mentioned to have taken ibuprofen and used rest to try to improve the pain. Noted to have improved slightly over the year however has had to stop certain activities due to back pain. Noted to not be able to dead lift, squat, perform farmers carries, and long hikes to cause pain. Pt went to PCP who referred to PT. X-ray take on 10/23/2023 with results pending. Pain location/Descriptors: R lower back region, sharp and aching   Aggravating factors: working out, walking far, flight bag for work   Easing factors: leaning forward and pushing down on hips  Imaging: 10/23/23: taken awaiting results  Patient goals: be able to perform greater physical activity without pain  Special interest or hobbies: volleyball, running, working out, and hiking  Occupation:           Not a recurrent problem   Quality of life: good     Patient Goals  Patient goals for therapy: decreased pain, increased motion, increased strength, return to sport/leisure activities and independence with ADLs/IADLs     Pain  Current pain ratin  At best pain ratin  At worst pain ratin  Quality: sharp and dull ache  Relieving factors: change in position  Aggravating factors: walking and lifting     Social Support  Steps to enter house: yes  Stairs in house: yes   Lives in: multiple-level home  Lives with: parents     Employment status: working  Hand dominance: left        Diagnostic Tests  Abnormal x-ray: 10/23/23: awaiting results.   Treatments  No previous or current treatments           Objective      Palpation   Left   No palpable tenderness noted     Right   No palpable tenderness notes     Active Range of Motion      Lumbar Flexion: Active lumbar flexion: able to touch ground. WFL  Left lateral flexion:  Restriction level: WFL  Right lateral flexion:  Restriction level: WFL  Left rotation:  Restriction level: WFL  Right rotation:  Restriction level: WFL  Left Hip   Flexion: 135 degrees   Extension: 20 degrees      Right Hip   Flexion: 125 degrees   Extension: 20 degrees      Additional Active Range of Motion Details  R hamstring length: 25 deg from 0 deg. L hamstring length: 30 deg from 0 deg. Joint Play   Joints within functional limits: L4 and L5      Hypomobile: T8, T9, T10, T11, T12, L1, L2, L3 and S1      Strength/Myotome Testing      Left Hip   Planes of Motion   Flexion: 5  Extension: 5  Abduction: 5     Right Hip   Planes of Motion   Flexion: 5  Extension: 5  Abduction: 5       Precautions: R shoulder pain      Manuals 10/30 11/6 11/13 11/20 11/30 12/12       STM  R paraspinals, erector spinae, QL, trigger point release  R paraspinals, erector spinae, QL, trigger point release           Joint Mobility  P-A grade III T6-L5 P-A grade III T6-L5          Measurements      ROM, strength, palpation                    Neuro Re-Ed             Lateral stepping              Dead Bugs  10x10" w/ p pb           Planking side  3x20" sean; Min VC in order to maintain proper alignment and prevent hip hinging 3x30" sean;   3x30" sean;  3x30" sean;        Stir the pot CC TA activation  12lbs, 2x8 CW and CCW 18lbs, 2x8 CW and CCW  20lbs, 2x8 CW and CCW 20lbs, 2x8 CW and CCW       CC chops  2x8, 20lbs; Min VC in order to maintain squat and twist 2x12, 25lbs; Min VC in order to maintain squat and twist 2x12, 28lbs;  Min VC in order to bend knees more 2x8, 35lbs;  2x8, 35lbs;       TA activation CC ABC   1x sean, 12lbs 1x sean, 14lbs 1x sean, 16lbs 1x sean, 16lbs       CC reverse chops   25lbs, 3x12; Min VC in order to maintain squat and twist 28lbs, 3x12;  35lbs, 2x8;  35lbs, 2x8;        Bird dogs    2x8, UE alternating only, quadruped; Min VC in order to maintain neutral spine          Ther Ex             Clams R TB, 3x8            Hamstring stretch Seated  3x30"  HEP   Supine, G stretch strap 3x30" Supine, G stretch strap 3x30" Supine, G stretch strap 3x30"       CC hip abd             Lumbar rollouts  3 ways, seated, pb, 10x10"           treadmill  5' brisk walk 5' brisk walk 5' brisk walk 5'  1' walking  4' jogging 5'  1' walking  4' jogging       RDLs    10lb KB, 2x10; Min VC in order to prevent performing  squat 10lb KB, 3x8;  10lb KB, 3x8;                                   Ther Activity                                       Gait Training                                       Modalities Tazorac Counseling:  Patient advised that medication is irritating and drying.  Patient may need to apply sparingly and wash off after an hour before eventually leaving it on overnight.  The patient verbalized understanding of the proper use and possible adverse effects of tazorac.  All of the patient's questions and concerns were addressed.

## 2024-05-15 ENCOUNTER — OFFICE VISIT (OUTPATIENT)
Dept: FAMILY MEDICINE CLINIC | Facility: CLINIC | Age: 24
End: 2024-05-15

## 2024-05-15 VITALS
DIASTOLIC BLOOD PRESSURE: 90 MMHG | HEART RATE: 84 BPM | SYSTOLIC BLOOD PRESSURE: 140 MMHG | HEIGHT: 71 IN | BODY MASS INDEX: 25.76 KG/M2 | WEIGHT: 184 LBS

## 2024-05-15 DIAGNOSIS — Z02.89 ENCOUNTER FOR FEDERAL AVIATION ADMINISTRATION (FAA) EXAMINATION: Primary | ICD-10-CM

## 2024-05-15 PROCEDURE — 99499 UNLISTED E&M SERVICE: CPT | Performed by: FAMILY MEDICINE

## 2024-05-15 NOTE — PROGRESS NOTES
Chief Complaint   Patient presents with   • Physical Exam     Faa 1st class no correctives fausto

## 2025-01-23 ENCOUNTER — HOSPITAL ENCOUNTER (EMERGENCY)
Facility: HOSPITAL | Age: 25
Discharge: HOME | End: 2025-01-23
Attending: EMERGENCY MEDICINE
Payer: COMMERCIAL

## 2025-01-23 VITALS
RESPIRATION RATE: 18 BRPM | SYSTOLIC BLOOD PRESSURE: 157 MMHG | DIASTOLIC BLOOD PRESSURE: 98 MMHG | OXYGEN SATURATION: 100 % | HEART RATE: 79 BPM | TEMPERATURE: 97.8 F

## 2025-01-23 DIAGNOSIS — R51.9 ACUTE NONINTRACTABLE HEADACHE, UNSPECIFIED HEADACHE TYPE: Primary | ICD-10-CM

## 2025-01-23 PROCEDURE — 96365 THER/PROPH/DIAG IV INF INIT: CPT

## 2025-01-23 PROCEDURE — 99283 EMERGENCY DEPT VISIT LOW MDM: CPT

## 2025-01-23 PROCEDURE — 96368 THER/DIAG CONCURRENT INF: CPT

## 2025-01-23 PROCEDURE — 99284 EMERGENCY DEPT VISIT MOD MDM: CPT | Performed by: EMERGENCY MEDICINE

## 2025-01-23 PROCEDURE — 96375 TX/PRO/DX INJ NEW DRUG ADDON: CPT

## 2025-01-23 RX ORDER — MAGNESIUM SULFATE HEPTAHYDRATE 40 MG/ML
2 INJECTION, SOLUTION INTRAVENOUS ONCE
Status: COMPLETED | OUTPATIENT
Start: 2025-01-23 | End: 2025-01-23

## 2025-01-23 RX ORDER — ACETAMINOPHEN 10 MG/ML
1000 INJECTION, SOLUTION INTRAVENOUS ONCE
Status: COMPLETED | OUTPATIENT
Start: 2025-01-23 | End: 2025-01-23

## 2025-01-23 RX ORDER — NAPROXEN 500 MG/1
500 TABLET ORAL 2 TIMES DAILY WITH MEALS
Qty: 30 TABLET | Refills: 0 | Status: SHIPPED | OUTPATIENT
Start: 2025-01-23

## 2025-01-23 RX ORDER — KETOROLAC TROMETHAMINE 30 MG/ML
15 INJECTION, SOLUTION INTRAMUSCULAR; INTRAVENOUS ONCE
Status: COMPLETED | OUTPATIENT
Start: 2025-01-23 | End: 2025-01-23

## 2025-01-23 RX ORDER — DROPERIDOL 2.5 MG/ML
0.62 INJECTION, SOLUTION INTRAMUSCULAR; INTRAVENOUS ONCE
Status: DISCONTINUED | OUTPATIENT
Start: 2025-01-23 | End: 2025-01-23

## 2025-01-23 RX ADMIN — KETOROLAC TROMETHAMINE 15 MG: 30 INJECTION, SOLUTION INTRAMUSCULAR; INTRAVENOUS at 22:23

## 2025-01-23 RX ADMIN — MAGNESIUM SULFATE HEPTAHYDRATE 2 G: 40 INJECTION, SOLUTION INTRAVENOUS at 22:23

## 2025-01-23 RX ADMIN — SODIUM CHLORIDE 1000 ML: 0.9 INJECTION, SOLUTION INTRAVENOUS at 22:23

## 2025-01-23 RX ADMIN — ACETAMINOPHEN 1000 MG: 10 INJECTION INTRAVENOUS at 22:26

## 2025-01-23 NOTE — Clinical Note
Kashif Elena was seen and treated in our emergency department on 1/23/2025.                Diagnosis:     Kashif  may return to work on return date.    He may return on this date: 01/24/2025         If you have any questions or concerns, please don't hesitate to call.      Errol Perez, DO    ______________________________           _______________          _______________  Hospital Representative                              Date                                Time

## 2025-01-24 NOTE — ED ATTENDING ATTESTATION
1/23/2025  I, Anselmo Matthew MD, saw and evaluated the patient. I have discussed the patient with the resident/non-physician practitioner and agree with the resident's/non-physician practitioner's findings, Plan of Care, and MDM as documented in the resident's/non-physician practitioner's note, except where noted. All available labs and Radiology studies were reviewed.  I was present for key portions of any procedure(s) performed by the resident/non-physician practitioner and I was immediately available to provide assistance.       At this point I agree with the current assessment done in the Emergency Department.  I have conducted an independent evaluation of this patient a history and physical is as follows:    24-year-old male presented for evaluation of left-sided headache with some associated blurred vision and paresthesias in the left face and left arm beginning while he was driving this evening.  He reports history of similar migraines in the past.  Last one was at least a year ago.    Denies any chest pain, shortness of breath, palpitations.  On arrival he reports his paresthesias have improved still having headache.    No focal neurologic deficits.    ED Course     Patient reported symptomatic improvement with migraine cocktail.  Stable for discharge home.    Critical Care Time  Procedures

## 2025-01-24 NOTE — ED PROVIDER NOTES
Time reflects when diagnosis was documented in both MDM as applicable and the Disposition within this note       Time User Action Codes Description Comment    1/23/2025 11:01 PM Errol Perez Add [R51.9] Acute nonintractable headache, unspecified headache type           ED Disposition       ED Disposition   Discharge    Condition   Stable    Date/Time   Thu Jan 23, 2025 11:01 PM    Comment   Kashif Elena discharge to home/self care.                   Assessment & Plan       Medical Decision Making  DDx including but not limited to: tension headache, cluster headache, migraine, ICH, SAH, tumor, meningitis, temporal arteritis, dissection, reversable cerebral vasoconstriction syndrome (RCVS), glaucoma, carbon monoxide poisoning, zoster, sinusitis.     Patient is a healthy 24-year-old male presented the ED with headache beginning on the drive home tonight.  Had some numbness and tingling in his left arm in addition to some numbness along the facial nerve at the same time.  Has had migraines in the past with similar symptomatology.  Pain is currently mild, paresthesias have resolved on arrival without intervention.  He is otherwise hemodynamically stable, no acute distress.  No neurologic deficits, no infectious symptoms.    Patient's headache resolved with migraine cocktail, he is feeling well without any neurologic deficits, hemodynamically stable, no acute distress.  He is agreeable to plan for return precautions in addition to follow-up with his PCP as needed.  Rx naproxen prescribed for recurrence as necessary.    Risk  Prescription drug management.             Medications   sodium chloride 0.9 % bolus 1,000 mL (1,000 mL Intravenous New Bag 1/23/25 2223)   magnesium sulfate 2 g/50 mL IVPB (premix) 2 g (2 g Intravenous New Bag 1/23/25 2223)   ketorolac (TORADOL) injection 15 mg (15 mg Intravenous Given 1/23/25 2223)   acetaminophen (Ofirmev) injection 1,000 mg (1,000 mg Intravenous New Bag 1/23/25 2226)        ED Risk Strat Scores                                              History of Present Illness       Chief Complaint   Patient presents with    Headache     Was driving home from work when he started with headache and blurry vision in both eyes. Reports numbness in left arm, face, tongue.        Past Medical History:   Diagnosis Date    Finger fracture, right     Second finger      Past Surgical History:   Procedure Laterality Date    NO PAST SURGERIES        Family History   Problem Relation Age of Onset    Melanoma Mother     No Known Problems Father     Diabetes Maternal Grandfather     Skin cancer Paternal Grandfather     Substance Abuse Neg Hx     Mental illness Neg Hx     Colon cancer Neg Hx     Breast cancer Neg Hx     Prostate cancer Neg Hx       Social History     Tobacco Use    Smoking status: Never     Passive exposure: Never    Smokeless tobacco: Former     Types: Chew    Tobacco comments:     Used chew twice.   Vaping Use    Vaping status: Former    Quit date: 8/15/2019    Substances: Nicotine   Substance Use Topics    Alcohol use: Not Currently    Drug use: No      E-Cigarette/Vaping    E-Cigarette Use Former User     Quit Date 8/15/19       E-Cigarette/Vaping Substances    Nicotine Yes       I have reviewed and agree with the history as documented.     Patient is a healthy 24-year-old male presented the ED with headache beginning on the drive home tonight.  Had some numbness and tingling in his left arm in addition to some numbness along the facial nerve at the same time.  Has had migraines in the past with similar symptomatology.  Pain is currently mild, paresthesias have resolved on arrival without intervention.  He is otherwise hemodynamically stable, no acute distress.  No neurologic deficits, no infectious symptoms.      Headache      Review of Systems   Neurological:  Positive for headaches.   All other systems reviewed and are negative.          Objective       ED Triage Vitals    Temperature Pulse Blood Pressure Respirations SpO2 Patient Position - Orthostatic VS   01/23/25 2143 01/23/25 2143 01/23/25 2143 01/23/25 2143 01/23/25 2143 01/23/25 2143   97.8 °F (36.6 °C) 79 157/98 18 100 % Sitting      Temp Source Heart Rate Source BP Location FiO2 (%) Pain Score    01/23/25 2143 01/23/25 2143 01/23/25 2143 -- 01/23/25 2223    Oral Monitor Right arm  4      Vitals      Date and Time Temp Pulse SpO2 Resp BP Pain Score FACES Pain Rating User   01/23/25 2223 -- -- -- -- -- 4 -- KL   01/23/25 2143 97.8 °F (36.6 °C) 79 100 % 18 157/98 -- -- MD            Physical Exam  Vitals and nursing note reviewed.   Constitutional:       General: He is not in acute distress.     Appearance: Normal appearance. He is not ill-appearing, toxic-appearing or diaphoretic.   HENT:      Head: Normocephalic and atraumatic.      Nose: Nose normal. No congestion or rhinorrhea.      Mouth/Throat:      Mouth: Mucous membranes are moist.      Pharynx: Oropharynx is clear. No oropharyngeal exudate or posterior oropharyngeal erythema.   Eyes:      General: No scleral icterus.        Right eye: No discharge.         Left eye: No discharge.      Extraocular Movements: Extraocular movements intact.      Conjunctiva/sclera: Conjunctivae normal.      Pupils: Pupils are equal, round, and reactive to light.   Neck:      Vascular: No carotid bruit.   Cardiovascular:      Rate and Rhythm: Normal rate and regular rhythm.      Pulses: Normal pulses.      Heart sounds: Normal heart sounds. No murmur heard.     No friction rub. No gallop.   Pulmonary:      Effort: Pulmonary effort is normal. No respiratory distress.      Breath sounds: Normal breath sounds. No stridor. No wheezing, rhonchi or rales.   Chest:      Chest wall: No tenderness.   Abdominal:      General: Abdomen is flat. Bowel sounds are normal. There is no distension.      Palpations: Abdomen is soft. There is no mass.      Tenderness: There is no abdominal tenderness. There  is no right CVA tenderness, left CVA tenderness, guarding or rebound.      Hernia: No hernia is present.   Musculoskeletal:         General: No swelling, tenderness, deformity or signs of injury. Normal range of motion.      Cervical back: Normal range of motion and neck supple. No rigidity or tenderness.      Right lower leg: No edema.      Left lower leg: No edema.   Lymphadenopathy:      Cervical: No cervical adenopathy.   Skin:     General: Skin is warm and dry.      Coloration: Skin is not jaundiced or pale.      Findings: No bruising, erythema, lesion or rash.   Neurological:      General: No focal deficit present.      Mental Status: He is alert and oriented to person, place, and time.      Cranial Nerves: No cranial nerve deficit.      Sensory: No sensory deficit.      Motor: No weakness.      Coordination: Coordination normal.      Gait: Gait normal.      Deep Tendon Reflexes: Reflexes normal.   Psychiatric:         Mood and Affect: Mood normal.         Behavior: Behavior normal.         Results Reviewed       None            No orders to display       Procedures    ED Medication and Procedure Management   None     Patient's Medications   Discharge Prescriptions    NAPROXEN (NAPROSYN) 500 MG TABLET    Take 1 tablet (500 mg total) by mouth 2 (two) times a day with meals       Start Date: 1/23/2025 End Date: --       Order Dose: 500 mg       Quantity: 30 tablet    Refills: 0     No discharge procedures on file.  ED SEPSIS DOCUMENTATION   Time reflects when diagnosis was documented in both MDM as applicable and the Disposition within this note       Time User Action Codes Description Comment    1/23/2025 11:01 PM Errol Perez Add [R51.9] Acute nonintractable headache, unspecified headache type                  Errol Perez,   01/23/25 4710

## 2025-04-16 ENCOUNTER — OFFICE VISIT (OUTPATIENT)
Age: 25
End: 2025-04-16

## 2025-04-16 VITALS
DIASTOLIC BLOOD PRESSURE: 80 MMHG | HEART RATE: 82 BPM | SYSTOLIC BLOOD PRESSURE: 130 MMHG | BODY MASS INDEX: 26.2 KG/M2 | HEIGHT: 70 IN | WEIGHT: 183 LBS

## 2025-04-16 DIAGNOSIS — Z02.89 ENCOUNTER FOR FEDERAL AVIATION ADMINISTRATION (FAA) EXAMINATION: Primary | ICD-10-CM

## 2025-04-16 PROCEDURE — 99499FA: Performed by: FAMILY MEDICINE

## 2025-04-17 ENCOUNTER — OFFICE VISIT (OUTPATIENT)
Dept: FAMILY MEDICINE CLINIC | Facility: CLINIC | Age: 25
End: 2025-04-17
Payer: COMMERCIAL

## 2025-04-17 VITALS
TEMPERATURE: 98.2 F | OXYGEN SATURATION: 98 % | SYSTOLIC BLOOD PRESSURE: 106 MMHG | HEART RATE: 59 BPM | HEIGHT: 71 IN | WEIGHT: 182 LBS | DIASTOLIC BLOOD PRESSURE: 68 MMHG | BODY MASS INDEX: 25.48 KG/M2 | RESPIRATION RATE: 16 BRPM

## 2025-04-17 DIAGNOSIS — R53.83 FATIGUE, UNSPECIFIED TYPE: ICD-10-CM

## 2025-04-17 DIAGNOSIS — Z13.220 SCREENING, LIPID: Primary | ICD-10-CM

## 2025-04-17 PROCEDURE — 99203 OFFICE O/P NEW LOW 30 MIN: CPT | Performed by: INTERNAL MEDICINE

## 2025-04-17 NOTE — PROGRESS NOTES
Adult Annual Physical  Name: Kashif Elena      : 2000      MRN: [id number]  Encounter Provider: Jazmine Toussaint MD  Encounter Date: 2025   Encounter department: [facility]    :  Assessment & Plan  Screening, lipid    Orders:    Lipid panel; Future    CBC and differential; Future    Fatigue, unspecified type    Orders:    Comprehensive metabolic panel; Future        Preventive Screenings:  - Diabetes Screening: orders placed  - Cholesterol Screening: orders placed   - Hepatitis C screening: screening not indicated   - HIV screening: screening not indicated   - Colon cancer screening: screening not indicated   - Lung cancer screening: screening not indicated   - Prostate cancer screening: screening not indicated     Immunizations:  - Immunizations due: Influenza    Counseling/Anticipatory Guidance:  - Alcohol: discussed moderation in alcohol intake and recommendations for healthy alcohol use.   - Drug use: discussed harms of illicit drug use and how it can negatively impact mental/physical health.   - Tobacco use: discussed harms of tobacco use and management options for quitting.   - Dental health: discussed importance of regular tooth brushing, flossing, and dental visits.   - Sexual health: discussed sexually transmitted diseases, partner selection, use of condoms, avoidance of unintended pregnancy, and contraceptive alternatives.   - Diet: discussed recommendations for a healthy/well-balanced diet.   - Exercise: the importance of regular exercise/physical activity was discussed. Recommend exercise 3-5 times per week for at least 30 minutes.   - Injury prevention: discussed safety/seat belts, safety helmets, smoke detectors, carbon monoxide detectors, and smoking near bedding or upholstery.       Depression Screening and Follow-up Plan: Patient was screened for depression during today's encounter. They screened negative with a PHQ-2 score of 0.          History of Present Illness  "    Adult Annual Physical:  Patient presents for annual physical. No c/o.     Diet and Physical Activity:  - Diet/Nutrition: no special diet, portion control, limited junk food and adequate fiber intake.  - Exercise: walking, moderate cardiovascular exercise, strength training exercises, 1-2 times a week on average and 1-2 hours on average.    Depression Screening:  - PHQ-2 Score: 0    General Health:  - Sleep: sleeps well and > 8 hours of sleep on average.  - Hearing: normal hearing bilateral ears.  - Vision: no vision problems.  - Dental: regular dental visits, brushes teeth twice daily, brushes teeth once daily and floss regularly.     Health:  - History of STDs: no.   - Urinary symptoms: none.     Advanced Care Planning:  - Has an advanced directive?: no    - Has a durable medical POA?: no      Review of Systems   Constitutional:  Negative for chills and fever.   HENT:  Negative for ear pain and sore throat.    Eyes:  Negative for pain and visual disturbance.   Respiratory:  Negative for cough and shortness of breath.    Cardiovascular:  Negative for chest pain and palpitations.   Gastrointestinal:  Negative for abdominal pain and vomiting.   Genitourinary:  Negative for dysuria and hematuria.   Musculoskeletal:  Negative for arthralgias and back pain.   Skin:  Negative for color change and rash.   Neurological:  Negative for seizures and syncope.   All other systems reviewed and are negative.        Objective   /68 (BP Location: Left arm, Patient Position: Sitting, Cuff Size: Large)   Pulse 59   Temp 98.2 °F (36.8 °C) (Temporal)   Resp 16   Ht 5' 10.75\" (1.797 m)   Wt 82.6 kg (182 lb)   SpO2 98%   BMI 25.56 kg/m²     Physical Exam  Constitutional:       General: He is not in acute distress.     Appearance: He is well-developed.   HENT:      Head: Normocephalic.      Right Ear: External ear normal.      Left Ear: External ear normal.      Nose: Nose normal.      Mouth/Throat:      Pharynx: No " oropharyngeal exudate.   Eyes:      Pupils: Pupils are equal, round, and reactive to light.   Neck:      Thyroid: No thyromegaly.      Vascular: No JVD.   Cardiovascular:      Rate and Rhythm: Normal rate and regular rhythm.      Heart sounds: Normal heart sounds. No murmur heard.     No gallop.   Pulmonary:      Effort: Pulmonary effort is normal. No respiratory distress.      Breath sounds: Normal breath sounds. No wheezing or rales.   Abdominal:      General: Bowel sounds are normal. There is no distension.      Palpations: Abdomen is soft. There is no mass.      Tenderness: There is no abdominal tenderness.   Musculoskeletal:         General: No tenderness. Normal range of motion.      Cervical back: Normal range of motion and neck supple.   Lymphadenopathy:      Cervical: No cervical adenopathy.   Skin:     General: Skin is warm and dry.      Findings: No rash.   Neurological:      Mental Status: He is alert and oriented to person, place, and time.      Cranial Nerves: No cranial nerve deficit.      Coordination: Coordination normal.   Psychiatric:         Behavior: Behavior normal.         Thought Content: Thought content normal.         Judgment: Judgment normal.

## 2025-04-18 ENCOUNTER — APPOINTMENT (OUTPATIENT)
Dept: LAB | Facility: MEDICAL CENTER | Age: 25
End: 2025-04-18
Payer: COMMERCIAL

## 2025-04-18 DIAGNOSIS — Z13.220 SCREENING, LIPID: ICD-10-CM

## 2025-04-18 DIAGNOSIS — R53.83 FATIGUE, UNSPECIFIED TYPE: ICD-10-CM

## 2025-04-18 LAB
ALBUMIN SERPL BCG-MCNC: 4.5 G/DL (ref 3.5–5)
ALP SERPL-CCNC: 63 U/L (ref 34–104)
ALT SERPL W P-5'-P-CCNC: 20 U/L (ref 7–52)
ANION GAP SERPL CALCULATED.3IONS-SCNC: 8 MMOL/L (ref 4–13)
AST SERPL W P-5'-P-CCNC: 26 U/L (ref 13–39)
BASOPHILS # BLD AUTO: 0.05 THOUSANDS/ÂΜL (ref 0–0.1)
BASOPHILS NFR BLD AUTO: 1 % (ref 0–1)
BILIRUB SERPL-MCNC: 0.89 MG/DL (ref 0.2–1)
BUN SERPL-MCNC: 12 MG/DL (ref 5–25)
CALCIUM SERPL-MCNC: 9.4 MG/DL (ref 8.4–10.2)
CHLORIDE SERPL-SCNC: 103 MMOL/L (ref 96–108)
CHOLEST SERPL-MCNC: 132 MG/DL (ref ?–200)
CO2 SERPL-SCNC: 30 MMOL/L (ref 21–32)
CREAT SERPL-MCNC: 0.92 MG/DL (ref 0.6–1.3)
EOSINOPHIL # BLD AUTO: 0.37 THOUSAND/ÂΜL (ref 0–0.61)
EOSINOPHIL NFR BLD AUTO: 8 % (ref 0–6)
ERYTHROCYTE [DISTWIDTH] IN BLOOD BY AUTOMATED COUNT: 12.5 % (ref 11.6–15.1)
GFR SERPL CREATININE-BSD FRML MDRD: 114 ML/MIN/1.73SQ M
GLUCOSE P FAST SERPL-MCNC: 86 MG/DL (ref 65–99)
HCT VFR BLD AUTO: 47.5 % (ref 36.5–49.3)
HDLC SERPL-MCNC: 46 MG/DL
HGB BLD-MCNC: 16 G/DL (ref 12–17)
IMM GRANULOCYTES # BLD AUTO: 0.01 THOUSAND/UL (ref 0–0.2)
IMM GRANULOCYTES NFR BLD AUTO: 0 % (ref 0–2)
LDLC SERPL CALC-MCNC: 79 MG/DL (ref 0–100)
LYMPHOCYTES # BLD AUTO: 1.55 THOUSANDS/ÂΜL (ref 0.6–4.47)
LYMPHOCYTES NFR BLD AUTO: 35 % (ref 14–44)
MCH RBC QN AUTO: 29 PG (ref 26.8–34.3)
MCHC RBC AUTO-ENTMCNC: 33.7 G/DL (ref 31.4–37.4)
MCV RBC AUTO: 86 FL (ref 82–98)
MONOCYTES # BLD AUTO: 0.7 THOUSAND/ÂΜL (ref 0.17–1.22)
MONOCYTES NFR BLD AUTO: 16 % (ref 4–12)
NEUTROPHILS # BLD AUTO: 1.72 THOUSANDS/ÂΜL (ref 1.85–7.62)
NEUTS SEG NFR BLD AUTO: 40 % (ref 43–75)
NONHDLC SERPL-MCNC: 86 MG/DL
NRBC BLD AUTO-RTO: 0 /100 WBCS
PLATELET # BLD AUTO: 216 THOUSANDS/UL (ref 149–390)
PMV BLD AUTO: 11.1 FL (ref 8.9–12.7)
POTASSIUM SERPL-SCNC: 4.2 MMOL/L (ref 3.5–5.3)
PROT SERPL-MCNC: 6.8 G/DL (ref 6.4–8.4)
RBC # BLD AUTO: 5.52 MILLION/UL (ref 3.88–5.62)
SODIUM SERPL-SCNC: 141 MMOL/L (ref 135–147)
TRIGL SERPL-MCNC: 35 MG/DL (ref ?–150)
WBC # BLD AUTO: 4.4 THOUSAND/UL (ref 4.31–10.16)

## 2025-04-18 PROCEDURE — 36415 COLL VENOUS BLD VENIPUNCTURE: CPT

## 2025-04-18 PROCEDURE — 80061 LIPID PANEL: CPT

## 2025-04-18 PROCEDURE — 85025 COMPLETE CBC W/AUTO DIFF WBC: CPT

## 2025-04-18 PROCEDURE — 80053 COMPREHEN METABOLIC PANEL: CPT

## 2025-04-21 ENCOUNTER — RESULTS FOLLOW-UP (OUTPATIENT)
Dept: FAMILY MEDICINE CLINIC | Facility: CLINIC | Age: 25
End: 2025-04-21

## 2025-08-05 ENCOUNTER — TELEPHONE (OUTPATIENT)
Age: 25
End: 2025-08-05

## 2025-08-11 ENCOUNTER — OFFICE VISIT (OUTPATIENT)
Age: 25
End: 2025-08-11

## 2025-08-11 PROBLEM — M25.511 CHRONIC RIGHT SHOULDER PAIN: Status: RESOLVED | Noted: 2021-01-28 | Resolved: 2025-08-11

## 2025-08-11 PROBLEM — G89.29 CHRONIC RIGHT SHOULDER PAIN: Status: RESOLVED | Noted: 2021-01-28 | Resolved: 2025-08-11
